# Patient Record
Sex: FEMALE | Race: BLACK OR AFRICAN AMERICAN
[De-identification: names, ages, dates, MRNs, and addresses within clinical notes are randomized per-mention and may not be internally consistent; named-entity substitution may affect disease eponyms.]

---

## 2017-08-05 NOTE — RAD
Indication injury, pain.



AP oblique and lateral views of the right hand were obtained.



No bony abnormality is seen

## 2017-08-05 NOTE — RAD
Indication pain. Trauma.



A single view of the chest was obtained as well as films targeted to right

ribs. The chest is compared to an examination 9/14/2013.



The heart, pulmonary vessels and mediastinum appear unremarkable. There is no

acute parenchymal infiltrate significant pleural fluid collection or

pneumothorax. Films targeted to right ribs appear unremarkable.



IMPRESSION: No acute finding apparent in the chest.

                          Unremarkable plain films right ribs
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
## 2017-08-05 NOTE — RAD
Indication injury, pain.



AP oblique and lateral views of the right elbow were obtained.



No acute bony finding is seen. Significant joint fluid is not apparent.

## 2017-08-05 NOTE — RAD
Indication injury, pain.



AP oblique and lateral views of the right wrist were obtained.



No bony abnormality is seen

## 2017-08-05 NOTE — RAD
Indication injury, pain.



2 AP views of the right shoulder as well as a Y view were obtained.



No bony abnormality is seen

## 2017-10-06 NOTE — PHYS DOC
Past Medical History


Past Medical History:  Hyperthyroid, Kidney Infection, Seizure, Other


Additional Past Medical Histor:  SCOLIOSIS, SLEEP APNEA, cervical cancer


Past Surgical History:  Other


Additional Past Surgical Histo:  D&C x2, cervical bx


Alcohol Use:  Occasionally


Drug Use:  None





Adult General


Chief Complaint


Chief Complaint:  BACK PAIN - NO INJURY





[facility]


HPI





Patient is a 32  year old female with history of frequent kidney infections, 

hypertension, who presents today with moderate bilateral low back pain worse on 

the left side that has been going on for the last 1 week. Patient states she 

has history of scoliosis, she states she is concerned the pain could be urinary 

tract infection or her scoliosis. She states she has been taking Bactrim 800 mg 

3 times a day for the last 4 days with no improvement. Patient denies any pain 

radiating to bilateral lower extremities. Denies any loss of bowel bladder 

function. Denies any hematuria. Patient denies any injury.





Review of Systems


Review of Systems





Constitutional: Denies fever or chills []


Eyes: Denies change in visual acuity, redness, or eye pain []


HENT: Denies nasal congestion or sore throat []


Respiratory: Denies cough or shortness of breath []


Cardiovascular: No additional information not addressed in HPI []


GI: Denies abdominal pain, nausea, vomiting, bloody stools or diarrhea []


: Denies dysuria or hematuria []


Musculoskeletal: Low back pain


Integument: Denies rash or skin lesions []


Neurologic: Denies headache, focal weakness or sensory changes []


Endocrine: Denies polyuria or polydipsia []





Current Medications


Current Medications





Current Medications








 Medications


  (Trade)  Dose


 Ordered  Sig/[facility]  Start Time


 Stop Time Status Last Admin


Dose Admin


 


 Acetaminophen/


 Hydrocodone Bitart


  (Lortab 5/325)  2 tab  1X  ONCE  10/6/17 18:30


 10/6/17 18:31 DC 10/6/17 18:34


2 TAB


 


 Cyclobenzaprine


 HCl


  (Flexeril)  10 mg  1X  ONCE  10/6/17 18:30


 10/6/17 18:31 DC 10/6/17 18:34


10 MG


 


 Naproxen


  (Naprosyn)  500 mg  1X  STAT  10/6/17 18:23


 10/6/17 18:25 DC 10/6/17 18:34


500 MG











Allergies


Allergies





Allergies








Coded Allergies Type Severity Reaction Last Updated Verified


 


  Penicillins Allergy Intermediate  8/25/14 Yes











Physical Exam


Physical Exam





Constitutional: Well developed, well nourished, no acute distress, non-toxic 

appearance. []


HENT: Normocephalic, atraumatic, bilateral external ears normal, oropharynx 

moist, no oral exudates, nose normal. []


Eyes: PERRLA, EOMI, conjunctiva normal, no discharge. [] 


Neck: Normal range of motion, no tenderness, supple, no stridor. [] 


Cardiovascular:Heart rate regular rhythm, no murmur []


Lungs & Thorax:  Bilateral breath sounds clear to auscultation []


Abdomen: Bowel sounds normal, soft, no tenderness, no masses, no pulsatile 

masses. [] 


Skin: Warm, dry, no erythema, no rash. [] 


Back: Overweight patient, diffuse paraspinal muscle tenderness to the left SI 

joint, no midline lumbar spine tenderness, no CVA tenderness. [] 


Extremities: No tenderness, no cyanosis, no clubbing, ROM intact, no edema. [] 


Neurologic: Alert and oriented X 3, normal motor function, normal sensory 

function, no focal deficits noted. []


Psychologic: Affect normal, judgement normal, mood normal. []





Current Patient Data


Vital Signs





 Vital Signs








  Date Time  Temp Pulse Resp B/P (MAP) Pulse Ox O2 Delivery O2 Flow Rate FiO2


 


10/6/17 17:49 98.2 90 18  99 Room Air  





 98.2       








Lab Values





 Laboratory Tests








Test


  10/6/17


17:39 10/6/17


17:48


 


Urine Collection Type Unknown   


 


Urine Color Yellow   


 


Urine Clarity Clear   


 


Urine pH 6.0   


 


Urine Specific Gravity >=1.030   


 


Urine Protein


  Negative mg/dL


(NEG-TRACE) 


 


 


Urine Glucose (UA)


  Negative mg/dL


(NEG) 


 


 


Urine Ketones (Stick)


  Negative mg/dL


(NEG) 


 


 


Urine Blood


  Negative (NEG)


  


 


 


Urine Nitrite


  Negative (NEG)


  


 


 


Urine Bilirubin


  Negative (NEG)


  


 


 


Urine Urobilinogen Dipstick


  1.0 mg/dL (0.2


mg/dL) 


 


 


Urine Leukocyte Esterase Large (NEG)   


 


Urine RBC 0 /HPF (0-2)   


 


Urine WBC


  20-40 /HPF


(0-4) 


 


 


Urine Squamous Epithelial


Cells Mod /LPF  


  


 


 


Urine Bacteria


  Many /HPF


(0-FEW) 


 


 


Urine Mucus Mod /LPF   


 


POC Urine HCG, Qualitative


  


  Hcg negative


(Negative)











EKG


EKG


[]





Radiology/Procedures


Radiology/Procedures


[]





Course & Med Decision Making


Course & Med Decision Making


Pertinent Labs and Imaging studies reviewed. (See chart for details)





This is a 32-year-old female patient presenting today with mild to moderate low 

back pain for the last 1 week, she has been taking Bactrim DS one tablet 3 

times a day from an old prescription for the last 4 days. Urine was positive 

for UTI. Patient was discharged with Cipro. Instructed to stop taking Bactrim. 

Discharged with naproxen and Flexeril for her back pain. Follow-up with her PCP 

in 1-2 weeks.





Dragon Disclaimer


Dragon Disclaimer


This electronic medical record was generated, in whole or in part, using a 

voice recognition dictation system.





Departure


Departure


Impression:  


 Primary Impression:  


 UTI (lower urinary tract infection)


 Additional Impression:  


 Chronic low back pain


Disposition:  01 HOME, SELF-CARE


Condition:  STABLE


Referrals:  


NO PCP (PCP)


follow up with your doctor next week


Patient Instructions:  Back Pain, Adult, Urinary Tract Infection





Additional Instructions:  


You were seen with urinary tract infection, stop taking Bactrim. Start taking 

Cipro 1 tablet twice a day for 7 days. We also discharged you with fluconazole. 

Push fluids. Follow-up with your doctor in 1-2 weeks.


Scripts


Fluconazole (DIFLUCAN) 150 Mg Tablet


1 TAB PO ONCE, #1 TAB 1 Refill


   Prov: JILLIAN NAYLOR         10/6/17 


Ciprofloxacin Hcl (CIPRO) 500 Mg Tablet


1 TAB PO BID, #14 TAB


   Prov: JILLIAN NAYLOR APRN         10/6/17 


Cyclobenzaprine Hcl (CYCLOBENZAPRINE HCL) 10 Mg Tablet


1 TAB PO TID, #30 TAB


   Prov: JILLIAN NAYLOR         10/6/17





Problem Qualifiers








 Additional Impression:  


 Chronic low back pain


 Back pain laterality:  bilateral  Sciatica presence:  without sciatica  

Qualified Codes:  M54.5 - Low back pain; G89.29 - Other chronic pain








JILLIAN NAYLOR Oct 6, 2017 18:29

## 2018-09-11 NOTE — PHYS DOC
Past Medical History


Past Medical History:  Hypothyroid, Migraines, Seizure, Other


Additional Past Medical Histor:  scoliosis


Past Surgical History:  Other


Additional Past Surgical Histo:  D&C, LEEP


Alcohol Use:  Occasionally


Drug Use:  None





Adult General


Chief Complaint


Chief Complaint:  SEIZURE





HPI


HPI





Patient is a 33  year old female who presents with seizure like activity, she 

thinks 2 times today. She admits to left-sided neck pain worse with range of 

motion. She states that she has had an increased amount of seizures ever since 

her insurance changed and she had to cut her Topamax dose in half so that she 

can afford her pills.  She has multiple complaints today including sensation 

that she is about to have a migraine. She states that she frequently has 

migraines and gets a tingling in her fingers prior to getting her migraines. 

Her migraines were also controlled with Topamax.





Review of Systems


Review of Systems





Constitutional: Denies fever or chills 


Eyes: Denies change in visual acuity, redness, or eye pain 


HENT: Denies nasal congestion or sore throat 


Respiratory: Denies cough or shortness of breath 


Cardiovascular: No additional information not addressed in HPI 


GI: Denies abdominal pain, nausea, vomiting, bloody stools or diarrhea 


: Denies dysuria or hematuria 


Musculoskeletal: Denies back pain or joint pain 


Integument: Denies rash or skin lesions 


Neurologic: She admits to her typical sensory changes that she states is her 

migraine aura, she denies any focal weakness, trouble with communication, 

slurring speech, trouble with gait 


Endocrine: Denies polyuria or polydipsia 





All other systems were reviewed and found to be within normal limits, except as 

documented in this note.





Family History


Family History


noncontributory





Current Medications


Current Medications





Current Medications








 Medications


  (Trade)  Dose


 Ordered  Sig/Danielle  Start Time


 Stop Time Status Last Admin


Dose Admin


 


 Diazepam


  (Valium)  5 mg  1X  ONCE  9/11/18 18:15


 9/11/18 18:16   








topamax





Allergies


Allergies





Allergies








Coded Allergies Type Severity Reaction Last Updated Verified


 


  Penicillins Allergy Intermediate  8/25/14 Yes











Physical Exam


Physical Exam


GENERAL: Awake, alert, well appearing, nontoxic


HEAD/NECK/EYES: Normocephalic, Neck supple, PERRL , left sternocleidomastoid 

tenderness


ENT Airway patent, mucous membranes moist 


RESP: Nontachypneic, no respiratory distress, normal breath sounds bilaterally 


CV: Regular rhythm, normal perfusion 


ABD/GI: Soft, non-tender, no guarding, no rebound 


BACK: Inspection NL 


EXT: Neurovascularly intact, no deformities 


SKIN: Warm, dry 


NEURO: Oriented X3, normal speech, no motor deficits, no sensory deficits, CN 

II - XII intact 


PSYCH: Cooperative, appropriate affect





Current Patient Data


Vital Signs





 Vital Signs








  Date Time  Temp Pulse Resp B/P (MAP) Pulse Ox O2 Delivery O2 Flow Rate FiO2


 


9/11/18 17:25 98.1 83 18 154/97 (116) 100 Room Air  





 98.1       











EKG


EKG


[]





Radiology/Procedures


Radiology/Procedures


[]





Course & Med Decision Making


Course & Med Decision Making


Pertinent Labs and Imaging studies reviewed. (See chart for details)





[]





Dragon Disclaimer


Dragon Disclaimer


This electronic medical record was generated, in whole or in part, using a 

voice recognition dictation system.





Departure


Departure


Impression:  


 Primary Impression:  


 Seizure


 Additional Impression:  


 Torticollis, acute


Referrals:  


NO PCP (PCP)





Problem Qualifiers











PABLO NUNEZ DO Sep 11, 2018 18:04

## 2018-09-11 NOTE — [facility]
Bone densitometry 9/11/2018 3:20 PM

 

Indication: Long-term Depo-Provera use.

 

Comparison Study: None available.

 

Discussion:   Bone Densitometry was performed with dual photon absorption 

of the lumbar spine and left proximal femur..

 

Lumbar Spine:  Scoliotic changes of the lumbar spine are noted. Associated

disc degenerative changes are likely which may induce sclerosis.Bone 

average density is 1.209g/cm2 for L1-L4. T-Score is 1.5.  

 

 

Left femoral neck:     Bone average density is 1.163g/cm2.  T-Score is 

1.8. 

 

IMPRESSION: Normal bone mineral density of the lumbar spine and left 

femoral neck

 

Note: Definitions established by the World Health Organization:

   Normal: T-score is -1.0 or above.

   Osteopenia: T-score is between -1.0 and -2.5.

   Osteoporosis: T-score is -2.5 or below. 

 

 

 

 

Electronically signed by: Cristobal Harvey MD (9/11/2018 4:12 PM) [id number]

## 2020-05-11 NOTE — PHYS DOC
Past Medical History


Past Medical History:  Hypothyroid, Migraines, Seizure, Other


Additional Past Medical Histor:  scoliosis, GRAVES DX,


Past Surgical History:  Other


Additional Past Surgical Histo:  D&C, LEEP


Smoking Status:  Current Every Day Smoker


Alcohol Use:  Heavy


Drug Use:  None





General Adult


EDM:


Chief Complaint:  VAGINAL BLEEDING





HPI:


HPI:





34-year-old female presents with a 2 to 3-day history of heavy vaginal bleeding.

 She has had some suprapubic discomfort associated.  She describes this pain as 

a cramping.  She is gone through several pads in the last 24 hours.  She denies 

any lightheadedness or dizziness.  She states she is recently stopped taking 

birth control.  She is unsure if she may have been pregnant.  []





Review of Systems:


Review of Systems:


Constitutional:  Denies fever or chills. []


Eyes:  Denies change in visual acuity. []


HENT:  Denies nasal congestion or sore throat. [] 


Respiratory:  Denies cough or shortness of breath. [] 


Cardiovascular:  Denies chest pain or edema. [] 


GI:  Denies abdominal pain, nausea, vomiting, bloody stools or diarrhea. [] 


:  D Per HPI. [] 


Musculoskeletal:  Denies back pain or joint pain. [] 


Integument:  Denies rash. [] 


Neurologic:  Denies headache, focal weakness or sensory changes. [] 


Endocrine:  Denies polyuria or polydipsia. [] 


Lymphatic:  Denies swollen glands. [] 


Psychiatric:  Denies depression or anxiety. []





Heart Score:


Risk Factors:


Risk Factors:  DM, Current or recent (<one month) smoker, HTN, HLP, family 

history of CAD, obesity.


Risk Scores:


Score 0 - 3:  2.5% MACE over next 6 weeks - Discharge Home


Score 4 - 6:  20.3% MACE over next 6 weeks - Admit for Clinical Observation


Score 7 - 10:  72.7% MACE over next 6 weeks - Early Invasive Strategies





Allergies:


Allergies:





Allergies








Coded Allergies Type Severity Reaction Last Updated Verified


 


  Penicillins Allergy Intermediate  8/25/14 Yes











Physical Exam:


PE:





Constitutional: Well developed, well nourished, no acute distress, non-toxic 

appearance. []


HENT: Normocephalic, atraumatic, bilateral external ears normal, oropharynx 

moist, no oral exudates, nose normal. []


Eyes: PERRLA, EOMI, conjunctiva normal, no discharge. [] 


Neck: Normal range of motion, no tenderness, supple, no stridor. [] 


Cardiovascular:Heart rate regular rhythm, no murmur []


Lungs & Thorax:  Bilateral breath sounds clear to auscultation []


Abdomen: Bowel sounds normal, soft, no tenderness, no masses, no pulsatile 

masses. [] 


Skin: Warm, dry, no erythema, no rash. [] 


Back: No tenderness, no CVA tenderness. [] 


Extremities: No tenderness, no cyanosis, no clubbing, ROM intact, no edema. [] 


Neurologic: Alert and oriented X 3, normal motor function, normal sensory 

function, no focal deficits noted. []


Psychologic: Affect normal, judgement normal, mood normal. []





Current Patient Data:


Labs:





                                Laboratory Tests








Test


 5/11/20


07:40


 


White Blood Count


 8.0 x10^3/uL


(4.0-11.0)


 


Red Blood Count


 4.22 x10^6/uL


(3.50-5.40)


 


Hemoglobin


 13.5 g/dL


(12.0-15.5)


 


Hematocrit


 40.4 %


(36.0-47.0)


 


Mean Corpuscular Volume


 96 fL ()





 


Mean Corpuscular Hemoglobin 32 pg (25-35)  


 


Mean Corpuscular Hemoglobin


Concent 34 g/dL


(31-37)


 


Red Cell Distribution Width


 15.2 %


(11.5-14.5)  H


 


Platelet Count


 229 x10^3/uL


(140-400)


 


Neutrophils (%) (Auto) 64 % (31-73)  


 


Lymphocytes (%) (Auto) 27 % (24-48)  


 


Monocytes (%) (Auto) 7 % (0-9)  


 


Eosinophils (%) (Auto) 2 % (0-3)  


 


Basophils (%) (Auto) 1 % (0-3)  


 


Neutrophils # (Auto)


 5.1 x10^3/uL


(1.8-7.7)


 


Lymphocytes # (Auto)


 2.2 x10^3/uL


(1.0-4.8)


 


Monocytes # (Auto)


 0.6 x10^3/uL


(0.0-1.1)


 


Eosinophils # (Auto)


 0.1 x10^3/uL


(0.0-0.7)


 


Basophils # (Auto)


 0.1 x10^3/uL


(0.0-0.2)


 


Maternal Serum HCG Beta


Subunit < 1 mIU/mL


(0-5)





                                Laboratory Tests


5/11/20 07:40








Vital Signs:





                                   Vital Signs








  Date Time  Temp Pulse Resp B/P (MAP) Pulse Ox O2 Delivery O2 Flow Rate FiO2


 


5/11/20 07:22 98.5 101 16 151/94 (113) 100 Room Air  





 98.5       











EKG:


EKG:


[]





Radiology/Procedures:


Radiology/Procedures:


[]





Course & Med Decision Making:


Course & Med Decision Making


Pertinent Labs and Imaging studies reviewed. (See chart for details)





[]





Dragon Disclaimer:


Dragon Disclaimer:


This electronic medical record was generated, in whole or in part, using a voice

 recognition dictation system.





Departure


Departure


Impression:  


   Primary Impression:  


   Dysfunctional uterine bleeding


Disposition:  01 HOME, SELF-CARE


Condition:  STABLE


Referrals:  


NO PCP (PCP)








CITLALY SHARMA Jr, MD


Make an appointment with OB/GYN this week for recheck.  Return to the emergency 

department with any new or concerning symptoms


Patient Instructions:  Dysmenorrhea, Uterine Bleeding, Dysfunctional





Additional Instructions:  


Please call Dr. Sharma to arrange follow up











MANDO SOLIS DO             May 11, 2020 08:24

## 2021-08-27 NOTE — EKG
[facility]

              [address] [place] [address]

Test Date:    2021               Test Time:    11:04:35

Pat Name:     LE NUNEZ             Department:   

Patient ID:   [id number]           Room:          

Gender:       F                        Technician:   [id number]

:          1985               Requested By: NOAM ORTIZ

Order Number: [id number]           Reading MD:     

                                 Measurements

Intervals                              Axis          

Rate:         70                       P:            0

PA:           204                      QRS:          -6

QRSD:         94                       T:            -14

QT:           424                                    

QTc:          461                                    

                           Interpretive Statements

SINUS RHYTHM

LEFTWARD AXIS

T ABNORMALITY IN INFERIOR LEADS

ABNORMAL ECG

RI6.02

No previous ECG available for comparison

## 2021-08-27 NOTE — RAD
EXAM: Chest, single view.



HISTORY: Seizure.



COMPARISON: None.



FINDINGS: A frontal view of the chest is obtained. There is increased bilateral infrahilar opacity li
cheryl due to asymmetric overlying soft tissues. There there is also suspected bilateral infrahilar ate
lectasis. There is no consolidation, pleural effusion or pneumothorax. The heart is normal in size.



IMPRESSION: Bilateral infrahilar atelectasis.



Electronically signed by: Callie Kingston MD (8/27/2021 11:10 AM) [id number]

## 2021-08-27 NOTE — PHYS DOC
Past Medical History


Past Medical History:  Hypothyroid, Migraines, Seizure, Other


Additional Past Medical Histor:  GRAVES DISEASE, EPILEPSIEY


Past Surgical History:  Other


Additional Past Surgical Histo:  D&C, LEAP


Smoking Status:  Current Every Day Smoker


Alcohol Use:  Occasionally


Drug Use:  None





General Adult


EDM:


Chief Complaint:  SEIZURE





HPI:


HPI:





Patient is a 36-year-old female who presents to the emergency department via EMS

reporting she had a seizure at work that was witnessed by her boss.  Patient 

reports she works at a [employer], states she remembers feeling really 

hot and her mouth was watering, she remembers sitting on the floor and shaking, 

patient states the next thing she remembers is coming to an seeing her boss 

asking her if she was okay.  Patient reports a seizure history, states she is 

seeing a neurologist here at [facility] who is treating her with 

Topamax for migraines and seizures.  Otherwise she is treated with Motrin at 

home and no other medications.  Patient states she does not have grand mal sei

zures, patient describes her seizures as "I have spells ".  Patient denies loss 

of bowel or bladder during this episode.  Patient reports she was told she was 

out for approximately 30 seconds.  Patient denies recent fever or chills, denies

chest pain shortness of breath, denies body aches, visual disturbances, 

dizziness.  Patient reports she does have a headache that she describes as a 

normal migraine for her currently rating at a 7 out of 10 that is throbbing in 

nature at the front of her head.  Patient reports she has had much worse 

migraines in the past.  Patient denies taking any medications today.  Patient 

denies trying any nonpharmacological pain relief therapies.  Patient reports a 

history of Graves' disease, scoliosis, migraines, and seizures.  Patient reports

her last menstrual cycle was 1 week ago with normal duration of flow, reports a 

past surgical history of a D&C and a LEEP procedure years ago.  Patient reports 

she does smoke cigarettes daily, drinks alcohol on the weekends, denies illicit 

drug use or marijuana use.  Patient denies any other physical complaints or 

physical concerns.





Review of Systems:


Review of Systems:


14 body systems of review of systems have been reviewed.  See HPI for pertinent 

positives and negative responses, otherwise all other systems are negative, 

nonpertinent or noncontributory.


Constitutional: Negative except as outlined in HPI above.


Skin: Negative except as outlined in HPI above.


Eyes:   Negative except as outlined in HPI above.


HENT: Negative except as outlined in HPI above.


Respiratory:   Negative except as outlined in HPI above.


Cardiovascular:   Negative except as outlined in HPI above.


GI:   Negative except as outlined in HPI above.


:  Negative except as outlined in HPI above.


Musculoskeletal:   Negative except as outlined in HPI above.


Integument:   Negative except as outlined in HPI above.


Neurologic:   Negative except as outlined in HPI above.


Endocrine:   Negative except as outlined in HPI above.


Lymphatic:  Negative except as outlined in HPI above.


Psychiatric:  Negative except as outlined in HPI above.





Heart Score:


C/O Chest Pain:  No


Risk Factors:


Risk Factors:  DM, Current or recent (<one month) smoker, HTN, HLP, family 

history of CAD, obesity.


Risk Scores:


Score 0 - 3:  2.5% MACE over next 6 weeks - Discharge Home


Score 4 - 6:  20.3% MACE over next 6 weeks - Admit for Clinical Observation


Score 7 - 10:  72.7% MACE over next 6 weeks - Early Invasive Strategies





Current Medications:





Current Medications








 Medications


  (Trade)  Dose


 Ordered  Sig/Danielle  Start Time


 Stop Time Status Last Admin


Dose Admin


 


 Diphenhydramine


 HCl


  (Benadryl)  25 mg  1X  ONCE  21 11:00


 21 11:01 DC 21 11:29


25 MG


 


 Ketorolac


 Tromethamine


  (Toradol 30mg


 Vial)  30 mg  1X  ONCE  21 11:00


 21 11:01 DC 21 11:31


30 MG


 


 Ondansetron HCl


  (Zofran)  4 mg  1X  ONCE  21 11:00


 21 11:01 DC 21 11:26


4 MG


 


 Sodium Chloride  1,000 ml @ 


 1,000 mls/hr  1X  ONCE  21 11:00


 21 11:59 DC 21 11:00


1,000 MLS/HR











Allergies:


Allergies:





Allergies








Coded Allergies Type Severity Reaction Last Updated Verified


 


  Penicillins Allergy Intermediate  14 Yes











Physical Exam:


PE:





Constitutional: Well developed, well nourished, no acute distress, non-toxic 

appearance.  36-year-old female in no apparent distress.  Patient is not 

postictal.


HENT: Normocephalic, atraumatic.


Eyes: Conjunctiva normal, no discharge.


Neck: Normal range of motion, no stridor.  No C-spine tenderness, no meningismus

signs, no nuchal rigidity.


Cardiovascular: No cyanosis appreciated, distal cap refill less than 2 seconds. 

Rate and rhythm, heart sounds S1-S2 palpation.


Lungs & Thorax: Patient is in no respiratory distress, no audible adventitious 

lung sounds appreciated.  Normal work of breathing.


Abdomen: Nontender, no abnormalities noted.


Skin: Warm, dry, no erythema, no rash.  


Back: No tenderness, no deformities.


Extremities: No tenderness, no cyanosis, no clubbing, ROM intact, no edema.  


Neurologic: Alert and oriented X 3, normal motor function, normal sensory 

function, no focal deficits noted.  Patient ambulatory to bathroom to obtain 

urinalysis assay with steady gait.  No ataxia appreciated.


Psychologic: Affect normal, judgement normal, mood normal.





Current Patient Data:


Labs:





                                Laboratory Tests








Test


 21


10:57 21


11:17


 


White Blood Count


 5.8 x10^3/uL


(4.0-11.0) 





 


Red Blood Count


 4.12 x10^6/uL


(3.50-5.40) 





 


Hemoglobin


 12.6 g/dL


(12.0-15.5) 





 


Hematocrit


 37.8 %


(36.0-47.0) 





 


Mean Corpuscular Volume


 92 fL ()


 





 


Mean Corpuscular Hemoglobin 31 pg (25-35)   


 


Mean Corpuscular Hemoglobin


Concent 33 g/dL


(31-37) 





 


Red Cell Distribution Width


 13.9 %


(11.5-14.5) 





 


Platelet Count


 220 x10^3/uL


(140-400) 





 


Neutrophils (%) (Auto) 70 % (31-73)   


 


Lymphocytes (%) (Auto) 20 % (24-48)  L 


 


Monocytes (%) (Auto) 8 % (0-9)   


 


Eosinophils (%) (Auto) 2 % (0-3)   


 


Basophils (%) (Auto) 1 % (0-3)   


 


Neutrophils # (Auto)


 4.0 x10^3/uL


(1.8-7.7) 





 


Lymphocytes # (Auto)


 1.1 x10^3/uL


(1.0-4.8) 





 


Monocytes # (Auto)


 0.5 x10^3/uL


(0.0-1.1) 





 


Eosinophils # (Auto)


 0.1 x10^3/uL


(0.0-0.7) 





 


Basophils # (Auto)


 0.0 x10^3/uL


(0.0-0.2) 





 


Urine Collection Type Unknown   


 


Urine Color Dahlia   


 


Urine Clarity Clear   


 


Urine pH


 6.5 (<5.0-8.0)


 





 


Urine Specific Gravity


 1.020


(1.000-1.030) 





 


Urine Protein


 100 mg/dL


(NEG-TRACE) 





 


Urine Glucose (UA)


 Negative mg/dL


(NEG) 





 


Urine Ketones (Stick)


 Negative mg/dL


(NEG) 





 


Urine Blood


 Negative (NEG)


 





 


Urine Nitrite


 Negative (NEG)


 





 


Urine Bilirubin


 Negative (NEG)


 





 


Urine Urobilinogen Dipstick


 1.0 mg/dL (0.2


mg/dL) 





 


Urine Leukocyte Esterase Small (NEG)   


 


Urine RBC 0 /HPF (0-2)   


 


Urine WBC


 1-4 /HPF (0-4)


 





 


Urine Squamous Epithelial


Cells Many /LPF  


 





 


Urine Bacteria


 Few /HPF


(0-FEW) 





 


Urine Mucus Mod /LPF   


 


Sodium Level


 136 mmol/L


(136-145) 





 


Potassium Level


 4.1 mmol/L


(3.5-5.1) 





 


Chloride Level


 103 mmol/L


() 





 


Carbon Dioxide Level


 20 mmol/L


(21-32)  L 





 


Anion Gap 13 (6-14)   


 


Blood Urea Nitrogen


 9 mg/dL (7-20)


 





 


Creatinine


 0.7 mg/dL


(0.6-1.0) 





 


Estimated GFR


(Cockcroft-Gault) 115.2  


 





 


BUN/Creatinine Ratio 13 (6-20)   


 


Glucose Level


 95 mg/dL


(70-99) 





 


Calcium Level


 8.6 mg/dL


(8.5-10.1) 





 


Phosphorus Level


 2.9 mg/dL


(2.6-4.7) 





 


Magnesium Level


 2.2 mg/dL


(1.8-2.4) 





 


Total Bilirubin


 0.6 mg/dL


(0.2-1.0) 





 


Aspartate Amino Transferase


(AST) 15 U/L (15-37)


 





 


Alanine Aminotransferase (ALT)


 20 U/L (14-59)


 





 


Alkaline Phosphatase


 73 U/L


() 





 


Total Protein


 7.5 g/dL


(6.4-8.2) 





 


Albumin


 3.2 g/dL


(3.4-5.0)  L 





 


Albumin/Globulin Ratio


 0.7 (1.0-1.7)


L 





 


POC Urine HCG, Qualitative


 


 Hcg negative


(Negative)





                                Laboratory Tests


21 10:57








                                Laboratory Tests


21 10:57








Vital Signs:





                                   Vital Signs








  Date Time  Temp Pulse Resp B/P (MAP) Pulse Ox O2 Delivery O2 Flow Rate FiO2


 


21 11:38  66   98   


 


21 10:31 98.1  18 121/57  Room Air  





 98.1       











EKG:


EKG:


EKG performed at 1104 by ED nursing staff shows a normal sinus rhythm with a 

leftward axis, no other ectopy appreciated, heart rate 70 bpm, LA interval 

0.204, QTc interval 0.461, no acute STEMI, no ACS, no acute ischemia 

appreciated, EKG interpreted by ED attending physician Dr. Urban.





Radiology/Procedures:


Radiology/Procedures:


PATIENT: LE NUNEZ   ACCOUNT: [id number]     MRN#: [id number]


: 1985           LOCATION: ER              AGE: 36


SEX: F                    EXAM DT: 21         ACCESSION#: [id number]


STATUS: PRE ER            ORD. PHYSICIAN: NOAM ORTIZ


REASON: Seizure like activity


PROCEDURE: CHEST AP ONLY





EXAM: Chest, single view.





HISTORY: Seizure.





COMPARISON: None.





FINDINGS: A frontal view of the chest is obtained. There is increased bilateral 

infrahilar opacity likely due to asymmetric overlying soft tissues. There there 

is also suspected bilateral infrahilar atelectasis. There is no consolidation, 

pleural effusion or pneumothorax. The heart is normal in size.





IMPRESSION: Bilateral infrahilar atelectasis.





Electronically signed by: Callie Kingston MD (2021 11:10 AM) [id number]





Course & Med Decision Making:


Course & Med Decision Making


Pertinent Labs and Imaging studies reviewed. (See chart for details)





36-year-old female, triage vital signs reviewed, presents to the emergency 

department concerning seizure-like activity while at work today.  Physical 

examination unremarkable, however with patient's report of events will order, 

urinalysis assay, CBC, CMP.  EKG, chest x-ray.





Patient's labs unremarkable, EKG unremarkable, the patient's urine was not 

infected, upon reevaluation of the patient, patient reports her headache has 

resolved and she wishes to go home at this time.  Patient remains neurovascular 

intact, nontoxic in appearance, and in no apparent distress.  Sanya findings with

patient, strict follow-up with neurologist today or Monday.  Patient will be 

given a work excuse to make arrangement for neurology follow-up, patient is 

amenable to ED discharge planning.





Discussed with the patient all findings and diagnostic testing as well as the 

need to follow-up with their primary care provider for further evaluation and 

treatment or return to the ED if any new or worsening symptoms.  Strict return 

precautions were also discussed at length, the patient voiced understanding and 

agreement with the discharge planning.  The patient was nontoxic in appearance, 

in no apparent distress, and hemodynamically stable at the time of disposition.





Dragon Disclaimer:


Dragon Disclaimer:


This electronic medical record was generated, in whole or in part, using a voice

recognition dictation system.





Departure


Departure


Impression:  


   Primary Impression:  


   Seizure-like activity


   Additional Impression:  


   Headache


   Qualified Codes:  R51.9 - Headache, unspecified


Referrals:  


NO PCP (PCP)


Patient Instructions:  General Headache Without Cause, Seizure Disorder, Child, 

Generalized Tonic-Clonic





Additional Instructions:  


You were seen today in the emergency department for a seizure while you are at 

work today.  Lab work was performed, these tests are reassuring and that there 

were no acute findings that would suggest a need to be admitted to the hospital 

or immediate attention by a neurologist.  A urinalysis assay was performed 

today, your urine is not infected, you do not have a urinary tract infection.  

As we discussed, I am writing you a work excuse through Monday, please take this

time to make an appointment to see your neurologist for your ongoing seizure-li

ke activity.  Please return to the emergency department for worsening symptoms 

or other concerns.  Thank you for visiting our Emergency Department.  It was a 

pleasure taking care of you today in the emergency department and we appreciate 

you trusting us with your care. If any additional problems come up don't 

hesitate to return to visit us. Please follow up with your primary care provider

so they can plan additional care if needed and know about the problem that you 

had. If symptoms worsen come back to the Emergency Department. Any concerning 

symptoms that start such as chest pain, shortness of air, weakness or numbness 

on one side of the body, running high fevers or any other concerning symptoms 

return to the ER.





EMERGENCY DEPARTMENT GENERAL DISCHARGE INSTRUCTIONS





Thank you for coming to [facility] Emergency Department (ED) 

today and 


trusting us with you care.  We trust that you had a positive experience in our 

Emergency 


Department.  If you wish to speak to the department management, you may call the

Director at 


[phone].





YOUR FOLLOW UP INSTRUCTIONS ARE AS FOLLOWS:





1.  Do you have a private Doctor?  If you do not have a private doctor, please 

ask for a 


resource list of physicians or clinics that may be able to assist you with 

follow up care.





2.  The Emergency Physicain has interpreted your x-rays.  The X-Ray specialist 

will also 


review them.  If there is a change in the findings, you will be notified in 48 

hours when at 


all possible.





3.  A lab test or culture has been done, your results will be reviewed and you 

will be 


notified if you need a change in treatment.





ADDITIONAL INSTRUCTIONS AND INFORMATION:





1.  Your care today has been supervised by a physician who is specially trained 

in emergency 


care.  Many problems require more than one evaluation for a complete diagnosis 

and 


treatment.  We recommend that you schedule your follow up appointment as 

recommended to 


ensure complete treatment of you illness or injury.  If you are unable to obtain

follow up 


care and continue to have a problem, or if your condition worsens, we recommend 

that you 


return to the ED.





2.  We are not able to safely determine your condition over the phone nor are we

able to 


give sound medical advice over the phone.  For these safety reasons, if you call

for medical 


advice we will ask you to come to the ED for further evaluation.





3.  If you have any questions regarding these discharge instructions please call

the ED at 


[phone].





SAFETY INFORMATION:





In the interest of safety, wellness, and injury prevention; we encourage you to 

wear your 


sealbelt, if you smoke; quite smoking, and we encourage family to use a 

protective helmet 


for bicycling and other sporting events that present an increased risk for head 

injury.





IF YOUR SYMPTOMS WORSEN OR NEW SYMPTOMS DEVELOP, OR YOU HAVE CONCERNS ABOUT YOUR

CONDITION; 


OR IF YOUR CONDITION WORSENS WHILE YOU ARE WAITING FOR YOUR FOLLOW UP 

APPOINTMENT; EITHER 


CONTACT YOUR PRIMARY CARE DOCTOR, THE PHYSICIAN WHOSE NAME AND NUMBER YOU WERE 

GIVEN, OR 


RETURN TO THE ED IMMEDIATELY.











NOAM ORTIZ       Aug 27, 2021 12:41

## 2021-08-29 NOTE — PHYS DOC
Past Medical History


Past Medical History:  Hypothyroid, Migraines, Seizure, Other


Additional Past Medical Histor:  GRAVES DISEASE, EPILEPSIEY


 (ALTHEA ALFONSO APRN)


Past Surgical History:  Other


Additional Past Surgical Histo:  D&C, LEAP


 (ALTHEA ALFONSO APRN)


Smoking Status:  Never Smoker


Alcohol Use:  None


Drug Use:  None


 (ALTHEA ALFONSO APRN)





General Adult


EDM:


Chief Complaint:  OTHER COMPLAINTS





HPI:


HPI:





Patient is a 36  year old female who presents with last month she has been 

having tingling in her left leg and foot and will give out.  She states she does

have scoliosis.  She states she also has more frequent migraines and syncopal 

episodes of which she will break out in a sweat feel lightheaded and then passed

out.  She states she was just here Friday for the same thing.  She states that 

she knows she needs to follow-up with her neurologist.  She states she has not 

had 1 of these episodes since Friday.  States she has not vomited since Friday. 

Patient is wanting to know what is wrong with her and why she is having all 

these chronic problems.  Patient states she does not have a strenuous job.  

Patient has a history of Graves' disease, epilepsy, hypothyroidism, migraines, 

D&C, LEEP procedure.


 (ALTHEA ALFONSO APRN)





Review of Systems:


Review of Systems:


Constitutional:   Denies fever or chills. []


Eyes:   Denies change in visual acuity. []


HENT:   Denies nasal congestion or sore throat. [] 


Respiratory:   Denies cough or shortness of breath. [] 


Cardiovascular:   Denies chest pain or edema. [] 


GI:   Denies abdominal pain, nausea, +vomiting 2 days ago, denies bloody stools 

or diarrhea. [] 


:  Denies dysuria. [] 


Musculoskeletal:   Denies back pain or joint pain. + Chronic fatigue [] 


Integument:   Denies rash. [] 


Neurologic:   + Chronic headache, denies focal weakness or sensory changes. + 

Chronic left leg tingling. + Chronic syncope [] 


Endocrine:   Denies polyuria or polydipsia. [] 


Lymphatic:  Denies swollen glands. [] 


Psychiatric:  Denies depression or anxiety. []


 (ALTHEA ALFONSO APRN)





Heart Score:


C/O Chest Pain:  No


HEART Score for Chest Pain:  








HEART Score for Chest Pain Response (Comments) Value


 


History Slighlty/Non-Suspicious 0


 


ECG Normal 0


 


Age < 45 0


 


Risk Factors                            1 or 2 Risk Factors 1


 


Troponin < Normal Limit 0


 


Total  1








Risk Factors:


Risk Factors:  DM, Current or recent (<one month) smoker, HTN, HLP, family 

history of CAD, obesity.


Risk Scores:


Score 0 - 3:  2.5% MACE over next 6 weeks - Discharge Home


Score 4 - 6:  20.3% MACE over next 6 weeks - Admit for Clinical Observation


Score 7 - 10:  72.7% MACE over next 6 weeks - Early Invasive Strategies


 (ALTHEA ALFONSO)


C/O Chest Pain:  N/A


 (VIRY ZAVALA MD)





Allergies:


Allergies:





Allergies








Coded Allergies Type Severity Reaction Last Updated Verified


 


  Penicillins Allergy Intermediate  14 Yes








 (ALTHEA ALFONSO)





Physical Exam:


PE:





Constitutional: Well developed, well nourished, no acute distress, non-toxic 

appearance. []


HENT: Normocephalic, atraumatic, bilateral external ears normal, oropharynx 

moist, no oral exudates, nose normal. []


Eyes: PERRLA, EOMI, conjunctiva normal, no discharge. [] 


Neck: Normal range of motion, no tenderness, supple, no stridor. [] 


Cardiovascular:Heart rate regular rhythm, no murmur []


Lungs & Thorax:  Bilateral breath sounds clear to auscultation []


Abdomen: Bowel sounds normal, soft, no tenderness, no masses, no pulsatile 

masses. [] 


Skin: Warm, dry, no erythema, no rash. [] 


Back: No tenderness, no CVA tenderness. [] 


Extremities: No tenderness, no cyanosis, no clubbing, ROM intact, no edema. [] 


Neurologic: Alert and oriented X 3, normal motor function, normal sensory 

function, no focal deficits noted.  Sensory was not on the left upper leg []


Psychologic: Affect normal, judgement normal, mood normal. []


 (ALTHEA ALFONSO)





Current Patient Data:


Vital Signs:





                                   Vital Signs








  Date Time  Temp Pulse Resp B/P (MAP) Pulse Ox O2 Delivery O2 Flow Rate FiO2


 


21 20:49  85 18 115/78 (90) 99 Room Air  


 


21 20:42 98.6       





 98.6       








 (ALTHEA ALFONSO)





EKG:


EK and read by Dr. Zavala as sinus rhythm and no STEMI


 [facility]





Radiology/Procedures:


Radiology/Procedures:


[]


Impression:


[address] [address]


[phone]





IMAGING REPORT





Signed





PATIENT: LE NUNEZ   ACCOUNT: [id number]   MRN#: [id number]


: 1985   LOCATION: ER   AGE: 36


SEX: F   EXAM DT: 21   ACCESSION#: [id number]


STATUS: REG ER   ORD. PHYSICIAN: ALTHEA ALFONSO   


REASON: SYNCOPE


PROCEDURE: PORTABLE CHEST 1V





Single view chest dated 2021 9:28 PM:





COMPARISON: 2021





Clinical Indication: Syncope.





Findings:





Single upright portable exam of the chest was performed. Heart size upper limits

 of normal. There is some mild hazy increased density at the infrahilar regions,

 similar to slightly improved. No pleural effusion. No pneumothorax





IMPRESSION:


1. Improving bibasilar infiltrates.





Electronically signed by: Rojas Grajeda MD (2021 9:31 PM) [facility] [place]














DICTATED and SIGNED BY:     ROJAS GRAJEDA MD


DATE:     218MTH0 0


 (ALTHEA ALFONSO)


Radiology/Procedures:


                            [address] [address]


                                 [phone]


                                        


                                 IMAGING REPORT





                                     Signed





PATIENT: LE NUNEZ   ACCOUNT: [id number]     MRN#: [id number]


: 1985           LOCATION: ER              AGE: 36


SEX: F                    EXAM DT: 21         ACCESSION#: [id number]


STATUS: REG ER            ORD. PHYSICIAN: ALTHEA ALFONSO


REASON: dizziness


PROCEDURE: CT HEAD WO CONTRAST





EXAM:


1. CT head without contrast


2. CT lumbar spine without contrast





INDICATION: Dizziness, numbness in left upper leg





COMPARISON: None





TECHNIQUE: Axial CT imaging through the head without intravenous contrast. 

Coronal reformats were obtained. CT lumbar spine without contrast was performed.

 Coronal and sagittal reformats were obtained.


 





One or more of the following individualized dose reduction techniques were 

utilized for this examination:  





1. Automated exposure control


2. Adjustment of the mA and/or kV according to patient size


3. Use of iterative reconstruction technique.





FINDINGS:





CT head:


The ventricles and sulci are normal.  Grey-white matter differentiation is 

maintained. There is no intracranial hemorrhage, acute infarct, or mass lesion. 

Basal cisterns are clear.





The skull and scalp are intact. Paranasal sinuses and mastoid air cells are 

clear. Globes and orbits are intact..





CT lumbar spine:


There 5 lumbar vertebral bodies. No acute fracture. Alignment is normal. There 

is moderate focal levoscoliosis centered at L1-L2. Mild disc space narrowing at 

L1-L2. Remaining disc spaces are maintained. No definite canal narrowing. There 

is left foraminal narrowing at L5-S1.





The paraspinous musculature and the visualized portion of the retroperitoneum is

 unremarkable.





IMPRESSION: 


1. No acute intracranial abnormality.


2. No acute osseous abnormality of the lumbar spine.





Electronically signed by: Viry Engle MD (2021 1:40 AM) [id number]














DICTATED and SIGNED BY:     VIRY ENGLE MD


DATE:     21 1091BLA6 0


 (VIRY ZAVALA MD)


Course & Med Decision Making:


Course & Med Decision Making


Pertinent Labs and Imaging studies reviewed. (See chart for details)





COVID-19 CRITERIA:    The patient was evaluated during the global COVID-19 

pandemic, and that diagnosis was suspected/considered upon their initial 

presentation.  Their evaluation, treatment and testing was consistent with 

current guidelines for patients who present with complaints or symptoms that may

 be related to COVID-19.





See HPI.  Alert and oriented x4.  Ambulatory steady gait.  Speaks in full clear 

sentences.  Moves all extremities equally normal strength.  Slightly less 

sensation to the left upper abdomen.  Skin pink warm and dry.  No signs of 

stroke.  Vital signs are within normal limits.





Vital signs within normal limits.  Rapid Covid is negative.  Chest x-ray shows 

pneumonia.  I will treat her with antibiotic.  Patient needs to follow-up with 

her neurologist.





2330: Patient is signed off to Dr. Zavala.


[]


 ([facility],ALTHEA RODRIGUEZ APRN)


Course & Med Decision Making


Accepted patient care at shift change, pending CT of head and lumbar spine.  

Both of which were normal and patient discharged with practitioner previous 

plan.


 (VIRY ZAVALA MD)


Dragon Disclaimer:


Dragon Disclaimer:


This electronic medical record was generated, in whole or in part, using a voice

 recognition dictation system.


 (ALTHEA ALFONSO)





Departure


Departure


Impression:  


   Primary Impression:  


   Headache


   Qualified Codes:  R51.9 - Headache, unspecified


   Additional Impressions:  


   Fatigue


   Qualified Codes:  R53.83 - Other fatigue


   Person under investigation for COVID-19


   Pneumonia


   Qualified Codes:  J18.9 - Pneumonia, unspecified organism


Disposition:   HOME / SELF CARE / HOMELESS


Condition:  STABLE


Referrals:  


NO PCP (PCP)


Patient Instructions:  Fatigue, Pneumonia, Adult, Recurrent Migraine Headache





Additional Instructions:  


Follow-up with your neurologist as soon as possible.  Drink plenty of fluids.  

Take your medications as prescribed and with food.


Scripts


Azithromycin (AZITHROMYCIN TABLET) 250 Mg Tablet


1 PKG PO UD for 5 Days, #6 TAB 0 Refills


   2 the first day followed by 1 for days 2-5


   Prov: ALTHEA ALFONSO         21











ALTHEA ALFONSO            Aug 29, 2021 21:25


VIRY ZAVALA MD            Aug 30, 2021 01:47

## 2021-08-29 NOTE — RAD
Single view chest dated 8/29/2021 9:28 PM:



COMPARISON: 8/27/2021



Clinical Indication: Syncope.



Findings:



Single upright portable exam of the chest was performed. Heart size upper limits of normal. There is 
some mild hazy increased density at the infrahilar regions, similar to slightly improved. No pleural 
effusion. No pneumothorax



IMPRESSION:

1. Improving bibasilar infiltrates.



Electronically signed by: Rojas Grajeda MD (8/29/2021 9:31 PM) [facility]

## 2021-08-30 NOTE — EKG
[facility]

              [address] [place] [address]

Test Date:    2021               Test Time:    21:30:09

Pat Name:     LE NUNEZ             Department:   

Patient ID:   [id number]           Room:          

Gender:       F                        Technician:   

:          1985               Requested By: ALTHEA ALFONSO

Order Number: [id number]           Reading MD:     

                                 Measurements

Intervals                              Axis          

Rate:         77                       P:            16

NH:           192                      QRS:          -12

QRSD:         96                       T:            -4

QT:           364                                    

QTc:          414                                    

                           Interpretive Statements

SINUS RHYTHM

LEFTWARD AXIS

NON SPECIFIC T ABNORMALITY

BORDERLINE ECG

RI6.02

No previous ECG available for comparison

## 2021-08-30 NOTE — RAD
EXAM:

1. CT head without contrast

2. CT lumbar spine without contrast



INDICATION: Dizziness, numbness in left upper leg



COMPARISON: None



TECHNIQUE: Axial CT imaging through the head without intravenous contrast. Coronal reformats were obt
ained. CT lumbar spine without contrast was performed. Coronal and sagittal reformats were obtained.

 



One or more of the following individualized dose reduction techniques were utilized for this examinat
ion:  



1. Automated exposure control

2. Adjustment of the mA and/or kV according to patient size

3. Use of iterative reconstruction technique.



FINDINGS:



CT head:

The ventricles and sulci are normal.  Grey-white matter differentiation is maintained. There is no in
tracranial hemorrhage, acute infarct, or mass lesion. Basal cisterns are clear.



The skull and scalp are intact. Paranasal sinuses and mastoid air cells are clear. Globes and orbits 
are intact..



CT lumbar spine:

There 5 lumbar vertebral bodies. No acute fracture. Alignment is normal. There is moderate focal levo
scoliosis centered at L1-L2. Mild disc space narrowing at L1-L2. Remaining disc spaces are maintained
. No definite canal narrowing. There is left foraminal narrowing at L5-S1.



The paraspinous musculature and the visualized portion of the retroperitoneum is unremarkable.



IMPRESSION: 

1. No acute intracranial abnormality.

2. No acute osseous abnormality of the lumbar spine.



Electronically signed by: Viry Engle MD (8/30/2021 1:40 AM) [id number]

## 2021-09-07 VITALS — SYSTOLIC BLOOD PRESSURE: 134 MMHG | DIASTOLIC BLOOD PRESSURE: 90 MMHG

## 2021-09-07 NOTE — RAD
XR FOREARM_LEFT 2 VIEWS



History: Dog bite



Comparison: None.



Technique: 2 views the left forearm.



Findings:

Osseous mineralization is normal. No fracture or dislocaton. No significant degenerative changes. Ban
dage material is noted at the mid forearm. No radiopaque foreign body. No subcutaneous emphysema.



Impression: 

1.  No acute osseous abnormality of the left forearm. No radiopaque foreign body.



Electronically signed by: Zane Youssef MD (9/7/2021 11:22 AM) [facility]

## 2021-09-07 NOTE — PHYS DOC
Past Medical History


Past Medical History:  Hypothyroid, Migraines, Seizure, Other


Additional Past Medical Histor:  GRAVES DISEASE, EPILEPSIEY


Past Surgical History:  Other


Additional Past Surgical Histo:  D&C, LEAP


Smoking Status:  Never Smoker


Alcohol Use:  None


Drug Use:  None





General Adult


EDM:


Chief Complaint:  ANIMAL BITE





HPI:


HPI:





Patient is a 36  year old female presents to the emergency department chief 

complaint of being bit by a dog while she was at a family barbecue yesterday at 

approximately 8 PM.  Patient reports a dog was playing with her children, when 

she went to get her children to leave she reports the dog bit her on her left 

forearm, reports the dog's immunizations are up-to-date, has a collar.  Was 

acting friendly with the children.  Bit her once only.  Patient states she did 

not contact animal control, she went home and cleansed with soap and water.  

Patient reports her last tetanus immunization was greater than 5 years ago.  

Patient reports she woke up this morning with tingling and pain to her hand and 

forearm area near the dog bite sites.  Reports feeling a cold sensation to her 

arm.  Denies fever or chills.  Denies any other injury.  Reports allergy to 

penicillin.  Reports taking Topamax for seizures, is on her last day of 

azithromycin for reported pneumonia.  States she has no primary care physician. 

Denies any other physical complaints or physical concerns.





Review of Systems:


Review of Systems:


14 body systems of review of systems have been reviewed.  See HPI for pertinent 

positives and negative responses, otherwise all other systems are negative, 

nonpertinent or noncontributory.


Constitutional: Negative except as outlined in HPI above.


Skin: Negative except as outlined in HPI above.


Eyes:   Negative except as outlined in HPI above.


HENT: Negative except as outlined in HPI above.


Respiratory:   Negative except as outlined in HPI above.


Cardiovascular:   Negative except as outlined in HPI above.


GI:   Negative except as outlined in HPI above.


:  Negative except as outlined in HPI above.


Musculoskeletal:   Negative except as outlined in HPI above.


Integument:   Negative except as outlined in HPI above.


Neurologic:   Negative except as outlined in HPI above.


Endocrine:   Negative except as outlined in HPI above.


Lymphatic:  Negative except as outlined in HPI above.


Psychiatric:  Negative except as outlined in HPI above.





Heart Score:


C/O Chest Pain:  No


Risk Factors:


Risk Factors:  DM, Current or recent (<one month) smoker, HTN, HLP, family 

history of CAD, obesity.


Risk Scores:


Score 0 - 3:  2.5% MACE over next 6 weeks - Discharge Home


Score 4 - 6:  20.3% MACE over next 6 weeks - Admit for Clinical Observation


Score 7 - 10:  72.7% MACE over next 6 weeks - Early Invasive Strategies





Current Medications:





Current Medications








 Medications


  (Trade)  Dose


 Ordered  Sig/Danielle  Start Time


 Stop Time Status Last Admin


Dose Admin


 


 Acetaminophen


  (Tylenol)  650 mg  1X  ONCE  21 09:30


 21 09:31 DC 21 09:53


650 MG


 


 Bacitracin


  (Bacitracin Zinc


 Oint Pkt)  1 pkt  1X  ONCE  21 09:30


 21 09:31 DC 21 09:54


1 PKT


 


 Diphtheria/


 Tetanus/Acell


 Pertussis


  (ADACEL TDap


 SYRINGE)  0.5 ml  ONCE ONCE  21 09:30


 21 09:31 DC 21 09:57


0.5 ML


 


 Ibuprofen


  (Motrin)  600 mg  1X  ONCE  21 09:30


 21 09:31 DC 21 09:53


600 MG











Allergies:


Allergies:





Allergies








Coded Allergies Type Severity Reaction Last Updated Verified


 


  Penicillins Allergy Intermediate  14 Yes











Physical Exam:


PE:





Constitutional: Well developed, well nourished, no acute distress, non-toxic 

appearance.  36-year-old female in no apparent distress.


HENT: Normocephalic, atraumatic.


Eyes: Conjunctiva normal, no discharge.


Neck: Normal range of motion, no stridor.


Cardiovascular: No cyanosis appreciated, distal cap refill less than 2 seconds.


Lungs & Thorax: Patient is in no respiratory distress, no audible adventitious 

lung sounds appreciated.


Abdomen: Nontender, no abnormalities noted.


Skin: Warm, dry, no erythema, no rash.  See extremity note for focused skin 

assessment


Back: No tenderness, no deformities.


Extremities: No tenderness, no cyanosis, no clubbing, ROM intact, no edema.  

Except for left forearm, partial-thickness laceration puncture wound consistent 

with dog bite to anterior distal forearm 1 cm x 2 cm triangle shape, bleeding 

controlled, also 1 cm laceration puncture wound consistent with dog bite to the 

ulnar aspect of distal forearm anterior skin surface bleeding controlled.  D

istal cap refill less than 2 seconds, no loss of movement or strength, full 

AROM/PROM, patient complains of pain during range of motion examination, 

paresthesias distal to dog bite puncture wounds.  2+ radial pulse, no swelling, 

no erythema, no contusions appreciated.


Neurologic: Alert and oriented X 3, normal motor function, normal sensory 

function, no focal deficits noted. 


Psychologic: Affect normal, judgement normal, mood normal.





Current Patient Data:


Vital Signs:





                                   Vital Signs








  Date Time  Temp Pulse Resp B/P (MAP) Pulse Ox O2 Delivery O2 Flow Rate FiO2


 


21 09:11 98.5 102 18 134/90 (90) 98 Room Air  





 98.5       











EKG:


EKG:


[]





Radiology/Procedures:


Radiology/Procedures:


PATIENT: LE NUNEZ   ACCOUNT: [id number]     MRN#: [id number]


: 1985           LOCATION: ER              AGE: 36


SEX: F                    EXAM DT: 21         ACCESSION#: [id number]


STATUS: REG ER            ORD. PHYSICIAN: NOAM ORTIZ


REASON: Dog bite


PROCEDURE: FOREARM LEFT





XR FOREARM_LEFT 2 VIEWS





History: Dog bite





Comparison: None.





Technique: 2 views the left forearm.





Findings:


Osseous mineralization is normal. No fracture or dislocaton. No significant 

degenerative changes. Bandage material is noted at the mid forearm. No 

radiopaque foreign body. No subcutaneous emphysema.





Impression: 


1.  No acute osseous abnormality of the left forearm. No radiopaque foreign 

body.





Electronically signed by: Zane Youssef MD (2021 11:22 AM) [facility]





Course & Med Decision Making:


Course & Med Decision Making


Pertinent Labs and Imaging studies reviewed. (See chart for details)





36-year-old female, vital signs reviewed, presents emergency department 

concerning dog bite wound to the left forearm, incident happened at  

yesterday.  Physical examination consistent with patient's explanation of 

events.  Animal control contacted, patient's tetanus immunization brought 

up-to-date today with Adacel Tdap.  Cleanse and dress wounds, discussed with 

patient outside window for laceration repair related to incident happened 

yesterday.  Will cleansed and dressed with bacitracin ointment.  Order x-ray to 

rule out foreign body or bony injury.  Low likelihood of rabies infection, the 

patient and I made a joint decision to not treat for rabies related to dog's 

vaccination status.  X-ray to rule out foreign body or bony abnormality or 

fracture.  Paresthesias unlikely related to a compartment syndrome, there is no 

swelling, deformity, cap refill is less than 2 seconds, 2+ radial pulse, patient

 is amenable to ED planning.








X-ray negative for foreign body or injury.  Discussed findings with patient, 

discussed daily cleansing and wound care, return to ER precautions and concerns,

 strict follow-up with orthopedic specialist related to paresthesias of the 

hand.  Patient is amenable to ED discharge planning.





Discussed with the patient all findings and diagnostic testing as well as the 

need to follow-up with their primary care provider for further evaluation and t

reatment or return to the ED if any new or worsening symptoms.  Strict return 

precautions were also discussed at length, the patient voiced understanding and 

agreement with the discharge planning.  The patient was nontoxic in appearance, 

in no apparent distress, and hemodynamically stable at the time of disposition.





Dragon Disclaimer:


Dragon Disclaimer:


This electronic medical record was generated, in whole or in part, using a voice

 recognition dictation system.





Departure


Departure


Impression:  


   Primary Impression:  


   Dog bite


   Qualified Codes:  W54.0XXA - Bitten by dog, initial encounter


   Additional Impression:  


   Puncture wound


Disposition:  01 HOME / SELF CARE / HOMELESS


Condition:  GOOD


Referrals:  


NO PCP (PCP)


Patient Instructions:  Animal Bite, Open Wound, Forearm, Easy-to-Read, Puncture 

Wound





Additional Instructions:  


You were seen today in the emergency department for a dog bite to your left 

forearm.  This happened yesterday at 8 PM, this is outside the window of 

recommended suture repair, as we discussed please cleanse 2-3 times a day with 

mild soap and water, apply antibiotic ointment for wound healing, watch for 

signs and symptoms of infection.  I have started you on an antibiotic called 

clindamycin, please take as directed to help prevent infection.  An x-ray was 

done today in the emergency department, there were no signs of broken teeth or 

broken bones or foreign bodies in the dog bite wound sites.  Your wound was 

cleansed and dressed today in the emergency department.  I am prescribing you 

antibiotic ointment to use daily with your wound care at home.  As we discussed 

at length, please make an appointment to see an orthopedic specialist related to

 the tingling sensations of your hand.  You may use any orthopedic specialist of

 your choice, you may consider using the [facility] orthopedics 

located at [address], their telephone number is area code [phone]

[phone], please call today or tomorrow for an appointment.  Please return to 

the emergency department for worsening symptoms or other concerns.  Thank you 

for visiting our Emergency Department.  It was a pleasure taking care of you 

today in the emergency department and we appreciate you trusting us with your 

care. If any additional problems come up don't hesitate to return to visit us. 

Please follow up with your primary care provider so they can plan additional 

care if needed and know about the problem that you had. If symptoms worsen come 

back to the Emergency Department. Any concerning symptoms that start such as 

chest pain, shortness of air, weakness or numbness on one side of the body, 

running high fevers or any other concerning symptoms return to the ER.





EMERGENCY DEPARTMENT GENERAL DISCHARGE INSTRUCTIONS





Thank you for coming to [facility] Emergency Department (ED) 

today and 


trusting us with you care.  We trust that you had a positive experience in our 

Emergency 


Department.  If you wish to speak to the department management, you may call the

 Director at 


[phone].





YOUR FOLLOW UP INSTRUCTIONS ARE AS FOLLOWS:





1.  Do you have a private Doctor?  If you do not have a private doctor, please 

ask for a 


resource list of physicians or clinics that may be able to assist you with 

follow up care.





2.  The Emergency Physicain has interpreted your x-rays.  The X-Ray specialist 

will also 


review them.  If there is a change in the findings, you will be notified in 48 

hours when at 


all possible.





3.  A lab test or culture has been done, your results will be reviewed and you 

will be 


notified if you need a change in treatment.





ADDITIONAL INSTRUCTIONS AND INFORMATION:





1.  Your care today has been supervised by a physician who is specially trained 

in emergency 


care.  Many problems require more than one evaluation for a complete diagnosis 

and 


treatment.  We recommend that you schedule your follow up appointment as 

recommended to 


ensure complete treatment of you illness or injury.  If you are unable to obtain

 follow up 


care and continue to have a problem, or if your condition worsens, we recommend 

that you 


return to the ED.





2.  We are not able to safely determine your condition over the phone nor are we

 able to 


give sound medical advice over the phone.  For these safety reasons, if you call

 for medical 


advice we will ask you to come to the ED for further evaluation.





3.  If you have any questions regarding these discharge instructions please call

 the ED at 


[phone].





SAFETY INFORMATION:





In the interest of safety, wellness, and injury prevention; we encourage you to 

wear your 


sealbelt, if you smoke; quite smoking, and we encourage family to use a 

protective helmet 


for bicycling and other sporting events that present an increased risk for head 

injury.





IF YOUR SYMPTOMS WORSEN OR NEW SYMPTOMS DEVELOP, OR YOU HAVE CONCERNS ABOUT YOUR

 CONDITION; 


OR IF YOUR CONDITION WORSENS WHILE YOU ARE WAITING FOR YOUR FOLLOW UP 

APPOINTMENT; EITHER 


CONTACT YOUR PRIMARY CARE DOCTOR, THE PHYSICIAN WHOSE NAME AND NUMBER YOU WERE 

GIVEN, OR 


RETURN TO THE ED IMMEDIATELY.


Scripts


Fluconazole (DIFLUCAN) 150 Mg Tablet


1 TAB PO ONCE for yeast infection, #1 TAB 1 Refill


   Prov: NOAM ORTIZ         21 


Bacitracin/Polymyxin B Sulfate (POLYSPORIN TOPICAL OINT) 28.3 Gm Oint...g.


1 DIYA TP TID for WOUND CARE, #1 EACH 0 Refills


   AS DIRECTED BY PHYSICIAN


   Prov: NOAM ORTIZ         21 


Sulfamethoxazole/Trimethoprim (BACTRIM DS TABLET) 1 Each Tablet


1 TAB PO BID for dog bite wound for 7 Days, #14 TAB 0 Refills


   Prov: NOAM ORTIZ         21 


Clindamycin Hcl (CLINDAMYCIN HCL) 150 Mg Capsule


3 CAP PO TID for animal bite wound for 7 Days, #63 CAP 0 Refills


   Prov: NOAM ORTIZ         21











NOAM ORTIZ        Sep 7, 2021 10:47

## 2021-10-19 ENCOUNTER — HOSPITAL ENCOUNTER (OUTPATIENT)
Dept: HOSPITAL 61 - ECHO | Age: 36
Discharge: HOME | End: 2021-10-19
Attending: INTERNAL MEDICINE
Payer: MEDICAID

## 2021-10-19 DIAGNOSIS — E66.9: ICD-10-CM

## 2021-10-19 DIAGNOSIS — I07.1: Primary | ICD-10-CM

## 2021-10-19 DIAGNOSIS — R06.09: ICD-10-CM

## 2021-10-19 PROCEDURE — C8929 TTE W OR WO FOL WCON,DOPPLER: HCPCS

## 2021-10-19 PROCEDURE — 93306 TTE W/DOPPLER COMPLETE: CPT

## 2021-10-20 NOTE — CARD
MR#: [id number]

Account#: [id number]

Accession#: [id number]

Date of Study: 10/19/2021

Ordering Physician: CITLALY LYNNE, 

Referring Physician: CITLALY LYNNE, 

Tech: Harvey Doe [facility]





--------------- APPROVED REPORT --------------





EXAM: Two-dimensional and M-mode echocardiogram with Doppler and color Doppler.



Other Information 

Quality : AverageHR: 88bpm

Rhythm : NSR



INDICATION

Dyspnea 



RISK FACTORS

Obesity   

Smoking 



2D DIMENSIONS 

Left Atrium(2D)4.6 (1.6-4.0cm)IVSd1.1 (0.7-1.1cm)

Aortic Root(2D)2.5 (2.0-3.7cm)LVDd4.6 (3.9-5.9cm)

PWd1.1 (0.7-1.1cm)LVDs2.2 (2.5-4.0cm)

FS (%) 52.3 %SV81.0 ml



Aortic Valve

AoV Peak Candelario.120.4cm/sAoV VTI22.1cm

AO Peak GR.5.8mmHgLVOT Peak Candelario.98.0cm/s

LVOT  VTI 16.94cmAO Mean GR.3mmHg



Mitral Valve

MV E Njsvfggi34.4cm/sMV DECEL [id number]

MV A Vrglbquy68.4cm/sMV HLT96zm

E/A  Ratio1.0MVA (PHT)3.02cm2



TDI

E/Lateral E'7.2E/Medial E'7.5



Pulmonary Valve

PV Peak Lxuawgmh61.0cm/sPV Peak Grad.3mmHg



Tricuspid Valve

TR P. Rymynuau967ee/sTR Peak Gr.19mmHg



Pulmonary Vein

S1 Dvpxrkov47.9cm/sD2 Coeezjvf13.4cm/s



 LEFT VENTRICLE 

The left ventricle is normal size. There is normal left ventricular wall thickness. The left ventricu
lar systolic function is normal and the ejection fraction is within normal range. LV ejection fractio
n is 55 to 60%. There is normal LV segmental wall motion. No left ventricle thrombus noted on this st
udy. There is no ventricular septal defect visualized. There is no left ventricular aneurysm. There i
s no mass noted in the left ventricle.



 RIGHT VENTRICLE 

The right ventricle is normal size. There is normal right ventricular wall thickness. The right ventr
icular systolic function is normal.



 ATRIA 

The left atrium is mildly dilated. The right atrium size is normal. The interatrial septum is intact 
with no evidence for an atrial septal defect or patent foramen ovale as noted on 2-D or Doppler imagi
ng.



 AORTIC VALVE 

The aortic valve is normal in structure and function. Doppler and Color Flow revealed no significant 
aortic regurgitation. There is no significant aortic valvular stenosis. There is no aortic valvular v
egetation.



 MITRAL VALVE 

The mitral valve is normal in structure and function. There is no evidence of mitral valve prolapse. 
There is no mitral valve stenosis. Doppler and Color Flow revealed no mitral valve regurgitation note
d.



 TRICUSPID VALVE 

The tricuspid valve is normal in structure and function. Doppler and Color Flow revealed mild tricusp
id regurgitation. There is no tricuspid valve prolapse or vegetation. There is no tricuspid valve jose manuel
nosis.



 PULMONIC VALVE 

The pulmonary valve is normal in structure and function. Doppler and Color Flow revealed no pulmonic 
valvular regurgitation. There is no pulmonic valvular stenosis.



 GREAT VESSELS 

The aortic root is normal in size. The ascending aorta is normal in size. The pulmonary artery is nor
mal. The IVC is normal in size and collapses >50% with inspiration.



 PERICARDIAL EFFUSION 

There is no pleural effusion. There is no evidence of significant pericardial effusion.



Critical Notification

Critical Value: No



<Conclusion>

The left ventricle is normal size.

The left ventricular systolic function is normal and the ejection fraction is within normal range.

LV ejection fraction is 55 to 60%.

Doppler and Color Flow revealed no significant aortic regurgitation.

There is no significant aortic valvular stenosis.

Doppler and Color Flow revealed no mitral valve regurgitation noted.

Doppler and Color Flow revealed mild tricuspid regurgitation.



Signed by : Citlaly Lynne MD

Electronically Approved : 10/20/2021 15:04:27

## 2021-12-28 ENCOUNTER — HOSPITAL ENCOUNTER (EMERGENCY)
Dept: HOSPITAL 61 - ER | Age: 36
Discharge: HOME | End: 2021-12-28
Payer: MEDICAID

## 2021-12-28 VITALS — WEIGHT: 274.48 LBS | BODY MASS INDEX: 45.73 KG/M2 | HEIGHT: 65 IN

## 2021-12-28 VITALS — SYSTOLIC BLOOD PRESSURE: 103 MMHG | DIASTOLIC BLOOD PRESSURE: 53 MMHG

## 2021-12-28 DIAGNOSIS — N39.0: ICD-10-CM

## 2021-12-28 DIAGNOSIS — G43.909: ICD-10-CM

## 2021-12-28 DIAGNOSIS — Z88.0: ICD-10-CM

## 2021-12-28 DIAGNOSIS — U07.1: Primary | ICD-10-CM

## 2021-12-28 DIAGNOSIS — F17.200: ICD-10-CM

## 2021-12-28 DIAGNOSIS — R31.9: ICD-10-CM

## 2021-12-28 DIAGNOSIS — G40.909: ICD-10-CM

## 2021-12-28 DIAGNOSIS — E03.9: ICD-10-CM

## 2021-12-28 LAB
ALBUMIN SERPL-MCNC: 3.5 G/DL (ref 3.4–5)
ALBUMIN/GLOB SERPL: 0.7 {RATIO} (ref 1–1.7)
ALP SERPL-CCNC: 100 U/L (ref 46–116)
ALT SERPL-CCNC: 35 U/L (ref 14–59)
AMORPH SED URNS QL MICRO: PRESENT /HPF
AMPHETAMINE/METHAMPHETAMINE: (no result)
ANION GAP SERPL CALC-SCNC: 15 MMOL/L (ref 6–14)
APTT PPP: YELLOW S
AST SERPL-CCNC: 25 U/L (ref 15–37)
BACTERIA #/AREA URNS HPF: (no result) /HPF
BARBITURATES UR-MCNC: (no result) UG/ML
BASOPHILS # BLD AUTO: 0 X10^3/UL (ref 0–0.2)
BASOPHILS NFR BLD: 1 % (ref 0–3)
BENZODIAZ UR-MCNC: (no result) UG/L
BILIRUB SERPL-MCNC: 0.3 MG/DL (ref 0.2–1)
BILIRUB UR QL STRIP: NEGATIVE
BUN SERPL-MCNC: 10 MG/DL (ref 7–20)
BUN/CREAT SERPL: 11 (ref 6–20)
CALCIUM SERPL-MCNC: 9.1 MG/DL (ref 8.5–10.1)
CANNABINOIDS UR-MCNC: (no result) UG/L
CHLORIDE SERPL-SCNC: 103 MMOL/L (ref 98–107)
CO2 SERPL-SCNC: 22 MMOL/L (ref 21–32)
COCAINE UR-MCNC: (no result) NG/ML
CREAT SERPL-MCNC: 0.9 MG/DL (ref 0.6–1)
EOSINOPHIL NFR BLD: 0.1 X10^3/UL (ref 0–0.7)
EOSINOPHIL NFR BLD: 2 % (ref 0–3)
ERYTHROCYTE [DISTWIDTH] IN BLOOD BY AUTOMATED COUNT: 15.1 % (ref 11.5–14.5)
FIBRINOGEN PPP-MCNC: CLEAR MG/DL
GFR SERPLBLD BASED ON 1.73 SQ M-ARVRAT: 86.2 ML/MIN
GLUCOSE SERPL-MCNC: 101 MG/DL (ref 70–99)
HCT VFR BLD CALC: 41.7 % (ref 36–47)
HGB BLD-MCNC: 13.7 G/DL (ref 12–15.5)
INFLUENZA A PATIENT: NEGATIVE
INFLUENZA B PATIENT: NEGATIVE
LYMPHOCYTES # BLD: 1.6 X10^3/UL (ref 1–4.8)
LYMPHOCYTES NFR BLD AUTO: 40 % (ref 24–48)
MCH RBC QN AUTO: 30 PG (ref 25–35)
MCHC RBC AUTO-ENTMCNC: 33 G/DL (ref 31–37)
MCV RBC AUTO: 91 FL (ref 79–100)
METHADONE SERPL-MCNC: (no result) NG/ML
MONO #: 0.5 X10^3/UL (ref 0–1.1)
MONOCYTES NFR BLD: 13 % (ref 0–9)
NEUT #: 1.8 X10^3/UL (ref 1.8–7.7)
NEUTROPHILS NFR BLD AUTO: 44 % (ref 31–73)
NITRITE UR QL STRIP: NEGATIVE
OPIATES UR-MCNC: (no result) NG/ML
PCP SERPL-MCNC: (no result) MG/DL
PH UR STRIP: 6 [PH]
PLATELET # BLD AUTO: 212 X10^3/UL (ref 140–400)
POTASSIUM SERPL-SCNC: 4.5 MMOL/L (ref 3.5–5.1)
PROT SERPL-MCNC: 8.7 G/DL (ref 6.4–8.2)
PROT UR STRIP-MCNC: NEGATIVE MG/DL
RBC # BLD AUTO: 4.58 X10^6/UL (ref 3.5–5.4)
RBC #/AREA URNS HPF: (no result) /HPF (ref 0–2)
SODIUM SERPL-SCNC: 140 MMOL/L (ref 136–145)
UROBILINOGEN UR-MCNC: 1 MG/DL
WBC # BLD AUTO: 4.1 X10^3/UL (ref 4–11)
WBC #/AREA URNS HPF: (no result) /HPF (ref 0–4)

## 2021-12-28 PROCEDURE — 96360 HYDRATION IV INFUSION INIT: CPT

## 2021-12-28 PROCEDURE — 80307 DRUG TEST PRSMV CHEM ANLYZR: CPT

## 2021-12-28 PROCEDURE — 87086 URINE CULTURE/COLONY COUNT: CPT

## 2021-12-28 PROCEDURE — 81001 URINALYSIS AUTO W/SCOPE: CPT

## 2021-12-28 PROCEDURE — 87426 SARSCOV CORONAVIRUS AG IA: CPT

## 2021-12-28 PROCEDURE — 36415 COLL VENOUS BLD VENIPUNCTURE: CPT

## 2021-12-28 PROCEDURE — 80053 COMPREHEN METABOLIC PANEL: CPT

## 2021-12-28 PROCEDURE — 93005 ELECTROCARDIOGRAM TRACING: CPT

## 2021-12-28 PROCEDURE — 81025 URINE PREGNANCY TEST: CPT

## 2021-12-28 PROCEDURE — 85025 COMPLETE CBC W/AUTO DIFF WBC: CPT

## 2021-12-28 PROCEDURE — 71045 X-RAY EXAM CHEST 1 VIEW: CPT

## 2021-12-28 PROCEDURE — 99285 EMERGENCY DEPT VISIT HI MDM: CPT

## 2021-12-28 PROCEDURE — 87804 INFLUENZA ASSAY W/OPTIC: CPT

## 2021-12-28 NOTE — RAD
XR CHEST 1V



Clinical Indication: Reason: cough / 



Comparison: AP chest August 29, 2021.



Findings: 

The cardiomediastinal silhouette is normal. There are hazy airspace opacities in the bilateral lung b
ases, similar to prior study. There is no pneumothorax. No pleural effusion is appreciated. No acute 
bone abnormality.



IMPRESSION: 

There are hazy bibasilar airspace opacities.





Electronically signed by: Danny Wetzel MD (12/28/2021 9:49 PM) [facility]

## 2021-12-28 NOTE — PHYS DOC
Past Medical History


Past Medical History:  Hypothyroid, Migraines, Seizure, Other


Additional Past Medical Histor:  GRAVES DISEASE, EPILEPSIEY


Past Surgical History:  Other


Additional Past Surgical Histo:  DNC


Smoking Status:  Current Every Day Smoker


Alcohol Use:  Occasionally


Drug Use:  None





General Adult


EDM:


Chief Complaint:  SEIZURE





HPI:


HPI:





Patient is a 36-year-old female that presents today after having 2 seizures 

today, and she is also having chest tightness and cough.  Patient states that 

over the past 2 weeks she has had increasing cough body aches and chest 

tightness today she took some Mucinex which she thinks triggers have her having 

seizures today x2.  Patient does have a history of seizure disorder, and is 

currently being managed by Dr. Burnett, neurology,  for her seizures.  Patient

states she did get tested about 2 weeks ago for COVID-19 and for influenza and 

she states that both the rapid and PCR for the COVID-19 were negative.  Patient 

continues to still have symptoms.  Patient has not had her Covid or influenza 

vaccines.





Review of Systems:


Review of Systems:


Constitutional:   Denies fever or chills. []


Eyes:   Denies change in visual acuity. []


HENT:   Denies nasal congestion or sore throat. [] 


Respiratory:  cough or shortness of breath. [] 


Cardiovascular:   chest pain; denies edema. [] 


GI:   Denies abdominal pain, nausea, vomiting, bloody stools or diarrhea. [] 


:  Denies dysuria. [] 


Musculoskeletal:   Denies back pain or joint pain. [] 


Integument:   Denies rash. [] 


Neurologic:   seizures x 2


Endocrine:   Denies polyuria or polydipsia. [] 


Lymphatic:  Denies swollen glands. [] 


Psychiatric:  Denies depression or anxiety. []





Heart Score:


C/O Chest Pain:  N/A


HEART Score for Chest Pain:  








HEART Score for Chest Pain Response (Comments) Value


 


History Slighlty/Non-Suspicious 0


 


ECG Normal 0


 


Age < 45 0


 


Risk Factors                            1 or 2 Risk Factors 1


 


Troponin < Normal Limit 0


 


Total  1








Risk Factors:


Risk Factors:  DM, Current or recent (<one month) smoker, HTN, HLP, family 

history of CAD, obesity.


Risk Scores:


Score 0 - 3:  2.5% MACE over next 6 weeks - Discharge Home


Score 4 - 6:  20.3% MACE over next 6 weeks - Admit for Clinical Observation


Score 7 - 10:  72.7% MACE over next 6 weeks - Early Invasive Strategies





Allergies:


Allergies:





Allergies








Coded Allergies Type Severity Reaction Last Updated Verified


 


  Penicillins Allergy Intermediate  8/25/14 Yes











Physical Exam:


PE:





Constitutional: Well developed, well nourished, no acute distress, non-toxic 

appearance. []


HENT: Normocephalic, atraumatic, bilateral external ears normal, oropharynx 

moist, no oral exudates, nose normal. []


Eyes: PERRLA, EOMI, conjunctiva normal, no discharge. [] 


Neck: Normal range of motion, no tenderness, supple, no stridor. [] 


Cardiovascular:Heart rate regular rhythm, no murmur []


Lungs & Thorax:  Bilateral breath sounds clear to auscultation []


Abdomen: Bowel sounds normal, soft, no tenderness, no masses, no pulsatile 

masses. [] 


Skin: Warm, dry, no erythema, no rash. [] 


Back: No tenderness, no CVA tenderness. [] 


Extremities: No tenderness, no cyanosis, no clubbing, ROM intact, no edema. [] 


Neurologic: Alert and oriented X 3, normal motor function, normal sensory 

function, no focal deficits noted. []


Psychologic: Affect normal, judgement normal, mood normal. []





Current Patient Data:


Labs:





Laboratory Tests








Test


 12/28/21


20:50 12/28/21


21:00 12/28/21


21:07 12/28/21


21:20


 


Urine Collection Type Void    


 


Urine Color Yellow    


 


Urine Clarity Clear    


 


Urine pH 6.0    


 


Urine Specific Gravity 1.020    


 


Urine Protein Negative mg/dL    


 


Urine Glucose (UA) Negative mg/dL    


 


Urine Ketones (Stick) Negative mg/dL    


 


Urine Blood Large    


 


Urine Nitrite Negative    


 


Urine Bilirubin Negative    


 


Urine Urobilinogen Dipstick 1.0 mg/dL    


 


Urine Leukocyte Esterase Small    


 


Urine RBC 1-2 /HPF    


 


Urine WBC 5-10 /HPF    


 


Urine Squamous Epithelial


Cells Many /LPF 


 


 


 





 


Urine Amorphous Sediment Present /HPF    


 


Urine Bacteria Few /HPF    


 


Urine Mucus Slight /LPF    


 


Urine Opiates Screen Neg    


 


Urine Methadone Screen Neg    


 


Urine Barbiturates Neg    


 


Urine Phencyclidine Screen Neg    


 


Urine


Amphetamine/Methamphetamine Neg 


 


 


 





 


Urine Benzodiazepines Screen Neg    


 


Urine Cocaine Screen Neg    


 


Urine Cannabinoids Screen Neg    


 


Urine Ethyl Alcohol Neg    


 


White Blood Count  4.1 x10^3/uL   


 


Red Blood Count  4.58 x10^6/uL   


 


Hemoglobin  13.7 g/dL   


 


Hematocrit  41.7 %   


 


Mean Corpuscular Volume  91 fL   


 


Mean Corpuscular Hemoglobin  30 pg   


 


Mean Corpuscular Hemoglobin


Concent 


 33 g/dL 


 


 





 


Red Cell Distribution Width  15.1 %   


 


Platelet Count  212 x10^3/uL   


 


Neutrophils (%) (Auto)  44 %   


 


Lymphocytes (%) (Auto)  40 %   


 


Monocytes (%) (Auto)  13 %   


 


Eosinophils (%) (Auto)  2 %   


 


Basophils (%) (Auto)  1 %   


 


Neutrophils # (Auto)  1.8 x10^3/uL   


 


Lymphocytes # (Auto)  1.6 x10^3/uL   


 


Monocytes # (Auto)  0.5 x10^3/uL   


 


Eosinophils # (Auto)  0.1 x10^3/uL   


 


Basophils # (Auto)  0.0 x10^3/uL   


 


Sodium Level  140 mmol/L   


 


Potassium Level  4.5 mmol/L   


 


Chloride Level  103 mmol/L   


 


Carbon Dioxide Level  22 mmol/L   


 


Anion Gap  15   


 


Blood Urea Nitrogen  10 mg/dL   


 


Creatinine  0.9 mg/dL   


 


Estimated GFR


(Cockcroft-Gault) 


 86.2 


 


 





 


BUN/Creatinine Ratio  11   


 


Glucose Level  101 mg/dL   


 


Calcium Level  9.1 mg/dL   


 


Total Bilirubin  0.3 mg/dL   


 


Aspartate Amino Transf


(AST/SGOT) 


 25 U/L 


 


 





 


Alanine Aminotransferase


(ALT/SGPT) 


 35 U/L 


 


 





 


Alkaline Phosphatase  100 U/L   


 


Total Protein  8.7 g/dL   


 


Albumin  3.5 g/dL   


 


Albumin/Globulin Ratio  0.7   


 


Bedside Urine HCG, Qualitative   Hcg negative  


 


Influenza Type A Antigen    Negative 


 


Influenza Type B Antigen    Negative 


 


SARS-CoV-2 Antigen (Rapid)    Positive 








Current Medications








 Medications


  (Trade)  Dose


 Ordered  Sig/Danielle


 Route


 PRN Reason  Start Time


 Stop Time Status Last Admin


Dose Admin


 


 Sodium Chloride  1,000 ml @ 


 999 mls/hr  1X  ONCE


 IV


   12/28/21 21:15


 12/28/21 22:15  12/28/21 21:15











                                Laboratory Tests








Test


 12/28/21


21:00


 


White Blood Count


 4.1 x10^3/uL


(4.0-11.0)


 


Red Blood Count


 4.58 x10^6/uL


(3.50-5.40)


 


Hemoglobin


 13.7 g/dL


(12.0-15.5)


 


Hematocrit


 41.7 %


(36.0-47.0)


 


Mean Corpuscular Volume


 91 fL ()





 


Mean Corpuscular Hemoglobin 30 pg (25-35)  


 


Mean Corpuscular Hemoglobin


Concent 33 g/dL


(31-37)


 


Red Cell Distribution Width


 15.1 %


(11.5-14.5)  H


 


Platelet Count


 212 x10^3/uL


(140-400)


 


Neutrophils (%) (Auto) 44 % (31-73)  


 


Lymphocytes (%) (Auto) 40 % (24-48)  


 


Monocytes (%) (Auto) 13 % (0-9)  H


 


Eosinophils (%) (Auto) 2 % (0-3)  


 


Basophils (%) (Auto) 1 % (0-3)  


 


Neutrophils # (Auto)


 1.8 x10^3/uL


(1.8-7.7)


 


Lymphocytes # (Auto)


 1.6 x10^3/uL


(1.0-4.8)


 


Monocytes # (Auto)


 0.5 x10^3/uL


(0.0-1.1)


 


Eosinophils # (Auto)


 0.1 x10^3/uL


(0.0-0.7)


 


Basophils # (Auto)


 0.0 x10^3/uL


(0.0-0.2)





                                Laboratory Tests


12/28/21 21:00








Vital Signs:





Vital Signs








  Date Time  Temp Pulse Resp B/P (MAP) Pulse Ox O2 Delivery O2 Flow Rate FiO2


 


12/28/21 21:57  95   98   


 


12/28/21 20:30 98.5 96 18 126/81 (96) 97 Room Air  





 98.5       








                                   Vital Signs








  Date Time  Temp Pulse Resp B/P (MAP) Pulse Ox O2 Delivery O2 Flow Rate FiO2


 


12/28/21 20:30 98.5 96 18 126/81 (96) 97 Room Air  





 98.5       











EKG:


EKG:


EKG done at 2102 read by Dr. Barrios at 2102 sinus tachycardia at a rate of 103 a

 [place] interval of 166 ms with a QT interval of 458 ms no STEMI []





Radiology/Procedures:


Radiology/Procedures:


REASON: cough


PROCEDURE: CHEST AP ONLY





XR CHEST 1V





Clinical Indication: Reason: cough / 





Comparison: AP chest August 29, 2021.





Findings: 


The cardiomediastinal silhouette is normal. There are hazy airspace opacities in

 the bilateral lung bases, similar to prior study. There is no pneumothorax. No 

pleural effusion is appreciated. No acute bone abnormality.





IMPRESSION: 


There are hazy bibasilar airspace opacities.








Electronically signed by: Danny Wetzel MD (12/28/2021 9:49 PM) [facility][]





Course & Med Decision Making:


Course & Med Decision Making


Pertinent Labs and Imaging studies reviewed. (See chart for details)





2210 spoke to patient regarding Covid positive status.  Patient verbalizes 

understanding.  All her other labs are within normal limits as well as her chest

 x-ray.  Have instructed patient to go home and quarantine 14 days from the date

 of her symptoms.  Patient states she has been symptomatic for almost 10 days so

 she will need to quarantine for another 4 days.  Patient is to take Tylenol an

d/or ibuprofen as needed for fever or chills.  Make sure she stays hydrated.  

Follow-up with her primary care physician by phone tomorrow for further 

management.  Return to the emergency department if increased work of breathing, 

fever not relieved by Tylenol and/or ibuprofen, continue to have multiple 

seizures throughout the day.





Dragon Disclaimer:


Dragon Disclaimer:


This electronic medical record was generated, in whole or in part, using a voice

 recognition dictation system.





Departure


Departure


Impression:  


   Primary Impression:  


   COVID-19


   Additional Impression:  


   UTI (urinary tract infection)


   Qualified Codes:  N39.0 - Urinary tract infection, site not specified; R31.9 

   - Hematuria, unspecified


Disposition:  01 HOME / SELF CARE / HOMELESS


Condition:  STABLE


Referrals:  


NO PCP (PCP)








NOAM ALBARADO MD


Patient Instructions:  Urinary Tract Infection





Additional Instructions:  


Macrobid 1 tablet twice daily for 7 days


Tylenol and/or ibuprofen as needed for fever and pain


See below for return instructions.You have been tested for or diagnosed with 

COVID-19. It is an infection caused by a new type 


of coronavirus. COVID-19 will cause cold-like or mild flu symptoms in most. It 

can cause 


more severe symptoms like problems breathing in some.





There is no treatment for COVID-19. The body will clear the infection over time.

 Self-care 


will help to ease discomfort.





Steps to Take:


Self-Care


Rest as needed. Healthy habits may help you feel better. Steps include:





Choose healthy foods including fruits and vegetables. Drink water throughout the

 day.


Get plenty of sleep each night.


If you smoke, try to quit. It may ease breathing.


Avoid alcohol.


Keep Others Healthy


The virus can spread to others. Droplets are released every time you sneeze or 

cough. The 


droplets can get into the mouth, nose, or eyes of people near you and lead to 

infection. To 


lower the chances of spreading COVID-19 to others:





Stay at home until your doctor has said it is safe to leave. If you tested 

positive this 


will mean staying isolated until both of the following are true:





At least 14 days have passed since the start of illness.


You are free of fever for at least 72 hours without the use of medicine.


During this time:





 - Avoid public areas, events, or transportation. Do not return to work or 

school until your 


doctor has said it is safe to do so.


 - Call ahead if you need to go to a medical center. Let them know you may have 

COVID-19. It 


will help them guide you where to go. They may also ask you to wear a facemask 

when you come 


to the office.


 - If you call for emergency medical services, let them know you may have COVID-

19.


While at home:





 - Try to avoid close contact with others. Stay about 6 feet away.


 - If possible, spend most of your time in a separate room from others.


 - Use a face mask if you will be in close contact with others such as sharing a

 room or 


vehicle.


 - Have someone wipe down common surfaces in the home. Use household  

every day on 


areas like doorknobs, counters, or sinks.


 - Cough or sneeze into a tissue. Throw the tissue away right after use. If a 

tissue is not 


available, cough or sneeze into your elbow.


 - Wash your hands often. Wash them after sneezing or coughing. Use soap and 

water and wash 


for at least 20 seconds. Alcohol based hand  can be used if soap and 

water is not 


available.


 - Do not prepare food for others. Avoid sharing personal items like forks, 

spoons, or 


toothbrushes.


 - Avoid close contact with pets while you are sick. There is no evidence of the

 virus 


passing to pets. This is a safety step until more is known about this virus.


Isolation can be frustrating. Social interaction can help. Keep in touch with 

friends and 


family through phone and tech options. You can still interact with others in 

your home, just 


keep a safe distance of about 6 feet.





Follow-up:


Your doctors office will check in with you to see if there are any changes in 

your health. 


You may be asked to keep track of symptoms to share with them. They will also 

let you know 


when you are clear to be in public again.





Problems to Look Out For:


Contact your doctor if your recovery is not going as you expect. Get emergency 

care if you 


have problems such as:





 - Trouble breathing


 - Nonstop chest pain or pressure


 - Changes in awareness, confusion, or problems waking


 - Lips or face have bluish color


 - Worsening of symptoms


If you think you have an emergency, call for emergency medical services right 

away.





As taken from [facility]


Scripts


Nitrofurantoin Monohyd/M-Cryst (MACROBID 100 MG CAPSULE) 100 Mg Capsule


1 CAP PO BID for 7 Days, #14 CAP 0 Refills


   Prov: MARGA CHAVEZ         12/28/21











MARGA CHAVEZ       Dec 28, 2021 21:11

## 2021-12-29 NOTE — EKG
[facility]

              [address] [place] [address]

Test Date:    2021               Test Time:    21:02:11

Pat Name:     LE NUNEZ             Department:   

Patient ID:   [id number]           Room:          

Gender:       F                        Technician:   

:          [id number]               Requested By: MARGA CHAVEZ

Order Number: [id number]           Reading MD:     

                                 Measurements

Intervals                              Axis          

Rate:         103                      P:            36

IA:           166                      QRS:          62

QRSD:         90                       T:            57

QT:           348                                    

QTc:          458                                    

                           Interpretive Statements

SINUS TACHYCARDIA

R-S TRANSITION ZONE IN V LEADS DISPLACED TO THE LEFT

NO SPECIFIC ECG ABNORMALITIES

RI6.01

No previous ECG available for comparison